# Patient Record
Sex: MALE | Race: WHITE | NOT HISPANIC OR LATINO | URBAN - METROPOLITAN AREA
[De-identification: names, ages, dates, MRNs, and addresses within clinical notes are randomized per-mention and may not be internally consistent; named-entity substitution may affect disease eponyms.]

---

## 2022-07-18 ENCOUNTER — TELEPHONE (OUTPATIENT)
Dept: SURGERY | Facility: CLINIC | Age: 22
End: 2022-07-18
Payer: COMMERCIAL

## 2022-08-03 ENCOUNTER — OFFICE VISIT (OUTPATIENT)
Dept: SURGERY | Facility: CLINIC | Age: 22
End: 2022-08-03
Payer: COMMERCIAL

## 2022-08-03 VITALS — WEIGHT: 118.69 LBS

## 2022-08-03 DIAGNOSIS — K44.9 HIATAL HERNIA: Primary | ICD-10-CM

## 2022-08-03 PROCEDURE — 99999 PR PBB SHADOW E&M-EST. PATIENT-LVL II: ICD-10-PCS | Mod: PBBFAC,,,

## 2022-08-03 PROCEDURE — 99999 PR PBB SHADOW E&M-EST. PATIENT-LVL II: CPT | Mod: PBBFAC,,,

## 2022-08-03 RX ORDER — LAMOTRIGINE 100 MG/1
100 TABLET ORAL DAILY
COMMUNITY

## 2022-08-03 RX ORDER — LAMOTRIGINE 150 MG/1
1 TABLET ORAL 2 TIMES DAILY
COMMUNITY
Start: 2022-01-06

## 2022-08-03 RX ORDER — IBUPROFEN 200 MG
1 CAPSULE ORAL DAILY
COMMUNITY

## 2022-08-03 RX ORDER — SERTRALINE HYDROCHLORIDE 50 MG/1
50 TABLET, FILM COATED ORAL DAILY
COMMUNITY
Start: 2022-07-31

## 2022-08-03 RX ORDER — ZONISAMIDE 100 MG/1
100 CAPSULE ORAL 2 TIMES DAILY
COMMUNITY
Start: 2022-07-07

## 2022-08-03 RX ORDER — CYPROHEPTADINE HYDROCHLORIDE 4 MG/1
1 TABLET ORAL DAILY
COMMUNITY
Start: 2022-01-06

## 2022-08-03 RX ORDER — TAMSULOSIN HYDROCHLORIDE 0.4 MG/1
0.4 CAPSULE ORAL DAILY
COMMUNITY

## 2022-08-03 NOTE — PROGRESS NOTES
History & Physical    SUBJECTIVE:     History of Present Illness:  Patient is a 21 y.o. male presents with complicated medical history who was recently diagnosed with kidney stones.  He had a CT scan that also showed several incidental findings including Hiatal Hernia.    He has a history of Nissen as a baby, but has been asymptomatic since then.  He has no history of reflux or abdominal pain.       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           Review of patient's allergies indicates:  No Known Allergies    Current Outpatient Medications   Medication Sig Dispense Refill    cyproheptadine (PERIACTIN) 4 mg tablet Take 1 tablet by mouth once daily.      lamoTRIgine (LAMICTAL) 150 MG Tab Take 1 tablet by mouth 2 (two) times daily.      calcium citrate (CALCITRATE) 200 mg (950 mg) tablet Take 1 tablet by mouth once daily.      lamoTRIgine (LAMICTAL) 100 MG tablet Take 100 mg by mouth once daily.      sertraline (ZOLOFT) 50 MG tablet Take 50 mg by mouth once daily.      tamsulosin (FLOMAX) 0.4 mg Cap Take 0.4 mg by mouth once daily.      zonisamide (ZONEGRAN) 100 MG Cap Take 100 mg by mouth 2 (two) times daily.       No current  facility-administered medications for this visit.       No past medical history on file.  No past surgical history on file.  No family history on file.        Review of Systems:  Review of Systems   Constitutional: Negative.    HENT: Negative.    Respiratory: Negative.    Cardiovascular: Negative.    Gastrointestinal: Negative.    Musculoskeletal: Negative.    Skin: Negative.    Neurological: Negative.        OBJECTIVE:     Vital Signs (Most Recent)        53.8 kg (118 lb 11.5 oz)     Physical Exam:  Physical Exam  HENT:      Head:      Comments: Facial features c/w his syndrome.  Eyes:      Extraocular Movements: Extraocular movements intact.   Cardiovascular:      Rate and Rhythm: Normal rate.   Pulmonary:      Effort: Pulmonary effort is normal.   Abdominal:      General: Abdomen is flat. There is no distension.      Palpations: Abdomen is soft.      Tenderness: There is no abdominal tenderness.   Musculoskeletal:         General: Normal range of motion.      Cervical back: Normal range of motion.   Skin:     General: Skin is warm.   Neurological:      General: No focal deficit present.      Mental Status: He is alert.   Psychiatric:         Mood and Affect: Mood normal.         Laboratory  na    Diagnostic Results:  CT c/w kidney stones, possible tiny gallstones, diverticulosis, and hiatal hernia .   Images and read reviewed.     ASSESSMENT/PLAN:     Assymptomatic Hiatal Hernia s/p Lap Nissen as a baby.    Discovered incidentally during w/u for kidney stones.     PLAN:Plan     Counseled re reasons for nissen revision and reasons to seek care.  I recommended no surgical intervention at this point, since the hernia was incidental and he has no symptoms.